# Patient Record
Sex: FEMALE | Race: WHITE | NOT HISPANIC OR LATINO | Employment: FULL TIME
[De-identification: names, ages, dates, MRNs, and addresses within clinical notes are randomized per-mention and may not be internally consistent; named-entity substitution may affect disease eponyms.]

---

## 2017-10-08 ENCOUNTER — HEALTH MAINTENANCE LETTER (OUTPATIENT)
Age: 47
End: 2017-10-08

## 2023-10-23 ENCOUNTER — ANCILLARY PROCEDURE (OUTPATIENT)
Dept: GENERAL RADIOLOGY | Facility: CLINIC | Age: 53
End: 2023-10-23
Attending: NURSE PRACTITIONER
Payer: COMMERCIAL

## 2023-10-23 ENCOUNTER — OFFICE VISIT (OUTPATIENT)
Dept: URGENT CARE | Facility: URGENT CARE | Age: 53
End: 2023-10-23
Payer: COMMERCIAL

## 2023-10-23 VITALS
TEMPERATURE: 97.1 F | WEIGHT: 242 LBS | DIASTOLIC BLOOD PRESSURE: 92 MMHG | RESPIRATION RATE: 21 BRPM | OXYGEN SATURATION: 98 % | SYSTOLIC BLOOD PRESSURE: 151 MMHG | HEART RATE: 108 BPM

## 2023-10-23 DIAGNOSIS — M79.672 LEFT FOOT PAIN: Primary | ICD-10-CM

## 2023-10-23 DIAGNOSIS — S93.602A SPRAIN OF LEFT FOOT, INITIAL ENCOUNTER: ICD-10-CM

## 2023-10-23 PROBLEM — K58.8 OTHER IRRITABLE BOWEL SYNDROME: Status: ACTIVE | Noted: 2020-12-18

## 2023-10-23 PROBLEM — R87.619 ABNORMAL PAP SMEAR OF CERVIX: Status: ACTIVE | Noted: 2023-10-23

## 2023-10-23 PROCEDURE — 73630 X-RAY EXAM OF FOOT: CPT | Mod: TC | Performed by: RADIOLOGY

## 2023-10-23 PROCEDURE — 99203 OFFICE O/P NEW LOW 30 MIN: CPT | Performed by: NURSE PRACTITIONER

## 2023-10-23 RX ORDER — LOSARTAN POTASSIUM 100 MG/1
1 TABLET ORAL DAILY
COMMUNITY
Start: 2023-06-13

## 2023-10-23 RX ORDER — BUPROPION HYDROCHLORIDE 150 MG/1
150 TABLET, EXTENDED RELEASE ORAL 2 TIMES DAILY
COMMUNITY
Start: 2023-03-18

## 2023-10-23 RX ORDER — PAROXETINE 20 MG/1
1 TABLET, FILM COATED ORAL EVERY MORNING
COMMUNITY
Start: 2023-06-13

## 2023-10-23 RX ORDER — HYDROCHLOROTHIAZIDE 25 MG/1
1 TABLET ORAL DAILY
COMMUNITY
Start: 2023-06-23

## 2023-10-23 RX ORDER — OMEPRAZOLE 40 MG/1
40 CAPSULE, DELAYED RELEASE ORAL DAILY
COMMUNITY
Start: 2023-06-13

## 2023-10-23 NOTE — PROGRESS NOTES
Assessment & Plan      Diagnosis Comments   1. Left foot pain  XR Foot Left G/E 3 Views       2. Sprain of left foot, initial encounter  Ankle/Foot Bracing Supplies DME Walking Boot; Left; Pneumatic; Short       Negative per radiology read for fracture.  Recommend treatment with supportive boot for therapy sprain left foot.  Discussed home care including elevation rest ice compression symptoms not improving in 2 to 4 weeks will follow-up with primary care provider  Tylenol and/or ibuprofen as needed for pain.  Patient verbalized understanding is agreement with plan      [facility] [place]    Kathleen Chavez is a 53 year old female who presents to clinic today for the following health issues:  Chief Complaint   Patient presents with    Foot Injury     Lt foot, hx stress fracture, no injury but pain is similar to last time. Hard time walking. Pain centered on the Lt side of the bottom of the foot.      HPI  Patient states she was in her garage working over the weekend on crafting standing on cement floor states noted left foot fifth metatarsal tenderness with weightbearing.  She states she has a personal history of fracture of this foot.  No recent injury.    Review of Systems  Constitutional, HEENT, cardiovascular, pulmonary, gi and gu systems are negative, except as otherwise noted.      Objective    BP (!) 151/92 (BP Location: Right arm, Cuff Size: Adult Large)   Pulse 108   Temp 97.1  F (36.2  C) (Tympanic)   Resp 21   Wt 109.8 kg (242 lb)   SpO2 98%   Physical Exam   GENERAL: healthy, alert and no distress  NECK: no adenopathy, no asymmetry, masses, or scars and thyroid normal to palpation  RESP: lungs clear to auscultation - no rales, rhonchi or wheezes  CV: regular rate and rhythm, normal S1 S2, no S3 or S4, no murmur, click or rub, no peripheral edema and peripheral pulses strong  MS: Mild tenderness with palpation left foot plantar lateral aspect.   CMS is intact normal pulses  SKIN: no suspicious lesions or rashes    Results for orders placed or performed in visit on 10/23/23   XR Foot Left G/E 3 Views     Status: None (Preliminary result)    Narrative    XR LEFT FOOT THREE OR MORE VIEWS   10/23/2023 2:58 PM     HISTORY:  Left foot pain    Comparison: None.      Impression    IMPRESSION: Mild to moderate degenerative changes in the ankle.  Degenerative changes between the bases of the fourth and fifth  metatarsals. Tiny Achilles enthesophyte. Pes cavus. Otherwise  negative.

## 2023-10-23 NOTE — PATIENT INSTRUCTIONS
Recommend wearing boot during activities and with standing. Elevate and ice every 3-4 hours for 10 min. Ibuprofen as needed with food.

## 2023-10-23 NOTE — RESULT ENCOUNTER NOTE
Results discussed with patient in clinic. States understanding of these results.    Evon Sanchez CNP